# Patient Record
Sex: FEMALE | Race: WHITE | Employment: STUDENT | URBAN - METROPOLITAN AREA
[De-identification: names, ages, dates, MRNs, and addresses within clinical notes are randomized per-mention and may not be internally consistent; named-entity substitution may affect disease eponyms.]

---
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
## 2021-02-19 PROBLEM — J30.2 SEASONAL ALLERGIC RHINITIS, UNSPECIFIED TRIGGER: Status: ACTIVE | Noted: 2021-02-19

## 2021-02-19 PROBLEM — Z00.129 ENCOUNTER FOR WELL CHILD CHECK WITHOUT ABNORMAL FINDINGS: Status: RESOLVED | Noted: 2018-08-02 | Resolved: 2021-02-19

## 2021-02-23 PROBLEM — F90.2 ATTENTION DEFICIT HYPERACTIVITY DISORDER, COMBINED TYPE: Status: ACTIVE | Noted: 2021-02-23

## 2021-02-23 PROBLEM — F33.0 MAJOR DEPRESSIVE DISORDER, RECURRENT EPISODE, MILD (HCC): Status: ACTIVE | Noted: 2021-02-23

## 2021-02-24 PROBLEM — F41.9 ANXIETY DISORDER: Status: ACTIVE | Noted: 2021-02-24

## 2021-07-28 ENCOUNTER — HOSPITAL ENCOUNTER (EMERGENCY)
Facility: HOSPITAL | Age: 21
Discharge: HOME | End: 2021-07-28
Attending: EMERGENCY MEDICINE
Payer: COMMERCIAL

## 2021-07-28 VITALS
RESPIRATION RATE: 16 BRPM | BODY MASS INDEX: 21.47 KG/M2 | WEIGHT: 150 LBS | DIASTOLIC BLOOD PRESSURE: 72 MMHG | TEMPERATURE: 97 F | SYSTOLIC BLOOD PRESSURE: 110 MMHG | HEART RATE: 79 BPM | OXYGEN SATURATION: 99 % | HEIGHT: 70 IN
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
## 2021-07-28 DIAGNOSIS — S06.0X0A CLOSED HEAD INJURY WITH CONCUSSION, WITHOUT LOSS OF CONSCIOUSNESS, INITIAL ENCOUNTER: ICD-10-CM

## 2021-07-28 LAB
AMPHET UR QL SCN: NEGATIVE
BENZODIAZ UR QL SCN: NEGATIVE
CANNABINOIDS UR QL SCN: NEGATIVE
COCAINE UR QL: NEGATIVE
CREAT UR-SCNC: 100 MG/DL
MDMA UR QL SCN: NEGATIVE
OPIATES UR QL SCN: NEGATIVE
OXYCODONE UR QL SCN: NEGATIVE

## 2021-07-28 PROCEDURE — 80307 DRUG TEST PRSMV CHEM ANLYZR: CPT | Performed by: EMERGENCY MEDICINE

## 2021-07-28 PROCEDURE — 99284 EMERGENCY DEPT VISIT MOD MDM: CPT

## 2021-07-28 PROCEDURE — 80307 DRUG TEST PRSMV CHEM ANLYZR: CPT
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
## 2021-10-13 PROBLEM — F33.2 MDD (MAJOR DEPRESSIVE DISORDER), RECURRENT SEVERE, WITHOUT PSYCHOSIS (HCC): Status: ACTIVE | Noted: 2021-10-13

## 2022-12-16 ENCOUNTER — HOSPITAL ENCOUNTER (OUTPATIENT)
Age: 22
Discharge: HOME | End: 2022-12-16
Payer: COMMERCIAL

## 2022-12-16 VITALS
HEIGHT: 70 IN | WEIGHT: 160 LBS | HEART RATE: 78 BPM | OXYGEN SATURATION: 98 % | BODY MASS INDEX: 22.9 KG/M2 | SYSTOLIC BLOOD PRESSURE: 98 MMHG | RESPIRATION RATE: 16 BRPM | TEMPERATURE: 98 F | DIASTOLIC BLOOD PRESSURE: 68 MMHG

## 2022-12-16 DIAGNOSIS — R05.1 ACUTE COUGH: Primary | ICD-10-CM

## 2022-12-16 PROCEDURE — 99203 OFFICE O/P NEW LOW 30 MIN: CPT | Performed by: PHYSICIAN ASSISTANT

## 2022-12-16 RX ORDER — ALBUTEROL SULFATE 90 UG/1
2 AEROSOL, METERED RESPIRATORY (INHALATION) EVERY 4 HOURS PRN
Qty: 1 EACH | Refills: 0 | Status: SHIPPED | OUTPATIENT
Start: 2022-12-16 | End: 2022-12-16

## 2022-12-16 RX ORDER — ALBUTEROL SULFATE 90 UG/1
2 AEROSOL, METERED RESPIRATORY (INHALATION) EVERY 4 HOURS PRN
Qty: 1 EACH | Refills: 0 | Status: SHIPPED | OUTPATIENT
Start: 2022-12-16 | End: 2023-01-15

## 2023-06-04 ENCOUNTER — HOSPITAL ENCOUNTER (OUTPATIENT)
Age: 23
Discharge: HOME | End: 2023-06-04
Payer: COMMERCIAL

## 2023-06-04 VITALS
HEART RATE: 99 BPM | BODY MASS INDEX: 22.9 KG/M2 | WEIGHT: 160 LBS | TEMPERATURE: 98 F | OXYGEN SATURATION: 98 % | HEIGHT: 70 IN | DIASTOLIC BLOOD PRESSURE: 71 MMHG | SYSTOLIC BLOOD PRESSURE: 115 MMHG | RESPIRATION RATE: 18 BRPM

## 2023-06-04 DIAGNOSIS — B34.9 VIRAL ILLNESS: Primary | ICD-10-CM

## 2023-06-04 LAB
S PYO AG THROAT QL: NEGATIVE
SARS-COV-2 RNA RESP QL NAA+PROBE: NOT DETECTED

## 2023-06-04 RX ORDER — ACETAMINOPHEN 500 MG
1000 TABLET ORAL ONCE
Status: COMPLETED | OUTPATIENT
Start: 2023-06-04 | End: 2023-06-04

## 2023-06-04 NOTE — DISCHARGE INSTRUCTIONS
Your COVID and strep test were negative. Warm salt water gargles and throat lozenges. Tylenol and ibuprofen for pain or fever. Stay home when you feel unwell. Follow-up with your primary care provider.

## 2023-10-30 PROBLEM — F33.9 MDD (MAJOR DEPRESSIVE DISORDER), RECURRENT EPISODE (HCC): Status: ACTIVE | Noted: 2023-10-30

## 2023-10-31 PROBLEM — F41.1 GENERALIZED ANXIETY DISORDER: Status: ACTIVE | Noted: 2021-02-24

## 2023-10-31 PROBLEM — F41.0 PANIC DISORDER WITHOUT AGORAPHOBIA: Chronic | Status: ACTIVE | Noted: 2023-10-31

## 2023-10-31 PROBLEM — F33.9 MDD (MAJOR DEPRESSIVE DISORDER), RECURRENT EPISODE (HCC): Status: RESOLVED | Noted: 2023-10-30 | Resolved: 2023-10-31

## 2023-11-20 PROBLEM — F33.2 MDD (MAJOR DEPRESSIVE DISORDER), RECURRENT SEVERE, WITHOUT PSYCHOSIS (HCC): Chronic | Status: ACTIVE | Noted: 2021-10-13

## 2024-12-27 ENCOUNTER — OFFICE VISIT (OUTPATIENT)
Dept: FAMILY MEDICINE CLINIC | Facility: CLINIC | Age: 24
End: 2024-12-27
Payer: COMMERCIAL

## 2024-12-27 VITALS
TEMPERATURE: 97 F | SYSTOLIC BLOOD PRESSURE: 106 MMHG | RESPIRATION RATE: 18 BRPM | BODY MASS INDEX: 25.05 KG/M2 | DIASTOLIC BLOOD PRESSURE: 67 MMHG | HEIGHT: 70 IN | HEART RATE: 95 BPM | WEIGHT: 175 LBS | OXYGEN SATURATION: 99 %

## 2024-12-27 DIAGNOSIS — Z02.89 ENCOUNTER TO OBTAIN EXCUSE FROM WORK: ICD-10-CM

## 2024-12-27 DIAGNOSIS — B34.9 VIRAL SYNDROME: ICD-10-CM

## 2024-12-27 DIAGNOSIS — R05.9 COUGH, UNSPECIFIED TYPE: Primary | ICD-10-CM

## 2024-12-27 PROCEDURE — 87637 SARSCOV2&INF A&B&RSV AMP PRB: CPT | Performed by: PHYSICIAN ASSISTANT

## 2024-12-27 PROCEDURE — 3074F SYST BP LT 130 MM HG: CPT | Performed by: PHYSICIAN ASSISTANT

## 2024-12-27 PROCEDURE — 99213 OFFICE O/P EST LOW 20 MIN: CPT | Performed by: PHYSICIAN ASSISTANT

## 2024-12-27 PROCEDURE — 3078F DIAST BP <80 MM HG: CPT | Performed by: PHYSICIAN ASSISTANT

## 2024-12-27 PROCEDURE — 3008F BODY MASS INDEX DOCD: CPT | Performed by: PHYSICIAN ASSISTANT

## 2024-12-27 NOTE — PROGRESS NOTES
CHIEF COMPLAINT:     Chief Complaint   Patient presents with    Flu     Started wed, cough nasal congestion   Note for work          HPI:   Suzi Valerio is a 24 year old adult who presents for abrupt onset of URI sx x 2 days. (+) body aches/HA, nasal congestion, rhinorrhea, chest congestion, NP cough.   ?subjective fever with chills/sweats.   Denies CP, no SOB/GUIDRY, no n/v/d.  H/o bronchitis in youth, otherwise denies asthma.   OTC cough/cold meds with some relief.   Needs work excuse note.     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     Current Outpatient Medications   Medication Sig Dispense Refill    FLUoxetine 20 MG Oral Cap Take 1 capsule (20 mg total) by mouth daily. Patient will take 20 mg along with 40 mg for a total of 60 mg daily 90 capsule 0    methylphenidate ER (CONCERTA) 27 MG Oral Tab CR Take 1 tablet (27 mg total) by mouth every morning. 10 tablet 0    FLUoxetine HCl 40 MG Oral Cap TAKE 1 CAPSULE BY MOUTH DAILY ALONG WITH 10MG FOR TOTAL OF 50MG DAILY 90 capsule 0    BUPROPION  MG Oral Tablet 24 Hr TAKE 1 TABLET BY MOUTH EVERY DAY IN THE MORNING 90 tablet 0    ARIPIPRAZOLE 5 MG Oral Tab TAKE 1 TABLET (5 MG TOTAL) BY MOUTH DAILY. 90 tablet 0      No past medical history on file.   No past surgical history on file.      Social History     Socioeconomic History    Marital status: Single   Tobacco Use    Smoking status: Never    Smokeless tobacco: Never   Vaping Use    Vaping status: Never Used   Substance and Sexual Activity    Alcohol use: Not Currently     Comment: special events only    Drug use: Not Currently     Types: Cannabis     Comment: once or twice a month edible use-rarely     Social Drivers of Health     Financial Resource Strain: Low Risk  (12/19/2024)    Financial Resource Strain     Med Affordability: No   Food Insecurity: Unknown (12/19/2024)    Food Insecurity     Food Insecurity: Patient declined   Transportation Needs: Unknown (12/19/2024)    Transportation  Needs     Lack of Transportation: Patient declined   Physical Activity: Not on File (9/15/2022)    Received from [facility]    Physical Activity     Physical Activity: 0   Stress: Not on File (9/15/2022)    Received from [facility]    Stress     Stress: 0   Social Connections: Not on File (9/13/2024)    Received from [facility]    Social Connections     Connectedness: 0   Housing Stability: Unknown (12/19/2024)    Housing Stability     Housing Instability: Patient declined         REVIEW OF SYSTEMS:   GENERAL: normal appetite  SKIN: no rashes or abnormal skin lesions  HEENT: See HPI  LUNGS: See HPI  CARDIOVASCULAR: denies chest pain or palpitations   GI: denies N/V/C or abdominal pain      EXAM:   /67   Pulse 95   Temp 97 °F (36.1 °C)   Resp 18   Ht 5' 10\" (1.778 m)   Wt 175 lb (79.4 kg)   LMP 12/02/2024   SpO2 99%   BMI 25.11 kg/m²   GENERAL: well developed, well nourished,in no apparent distress  SKIN: no rashes,no suspicious lesions  HEAD: atraumatic, normocephalic.  no tenderness on palpation of  sinuses  EYES: conjunctiva clear, EOM intact  EARS: TM's clear bilaterally  NOSE: Nostrils patent, clear nasal discharge, nasal mucosa erythematous/edematous   THROAT: Oral mucosa pink, moist. Posterior pharynx is mildly injected, no hypertrophy/exudates, uvula midline.   NECK: Supple, non-tender  LUNGS: clear to auscultation bilaterally, no wheezes or rhonchi. Breathing is non labored.  CARDIO: RRR without murmur  EXTREMITIES: no cyanosis, clubbing or edema  LYMPH:  shotty ant cervical LAD.   ASSESSMENT AND PLAN:   Suzi Valerio is a 24 year old adult who presents with upper respiratory symptoms that are consistent with    ASSESSMENT:   Encounter Diagnoses   Name Primary?    Cough, unspecified type Yes    Viral syndrome     Encounter to obtain excuse from work        PLAN:    Viral panel pending. Discussed COVID-19 antiviral therapy and interaction with ariprazole.  Reviewed if antiviral tx for COVID-19  desired, recommend contacting provider managing ariprazole to determine risk/benefit and dosage adjustment for ariprazole.  Pt reports she may not be interested in antiviral therapy and is more concerned with identifying illness given occupation and obtaining work excuse note for illness.    Viral etiology with expected course/duration reviewed, sx management discussed.   Work excuse note per epic, see letters.       The patient indicates understanding of these issues and agrees to the plan.  The patient is asked to f/u with PCP if sx's persist or worsen.  Patient Instructions   Viral panel pending, results in 24 hours.  (RSV, influenza A/B, COVID-19).     In event you have COVID-19, you are candidate for antiviral therapy within 5 days of symptom onset.  However, given potential for medication reaction with ariprazole, please contact provider managing this medication for antiviral therapy (paxlovid) if desired.       Encourage fluids, humidifier/vaporizor at bedside, elevate head of bed (sleep with extra pillow), vapor rub to chest, steam therapy if no fever, warm compresses for sinus pressure if no fever, salt water gargles for sore throat, lozenges for sore throat, may try over the counter saline nasal spray or irrigation kit (use distilled water with irrigation kit) for sinus pressure/congestion, get plenty of rest.          Follow up with your primary care provider if your symptoms fail to improve and resolve as anticipated    Go to the Immediate Care or Emergency Department in event of new or worsening symptoms at any time       Lori Summers PA-C

## 2024-12-28 LAB
FLUAV + FLUBV RNA SPEC NAA+PROBE: NOT DETECTED
FLUAV + FLUBV RNA SPEC NAA+PROBE: NOT DETECTED
RSV RNA SPEC NAA+PROBE: NOT DETECTED
SARS-COV-2 RNA RESP QL NAA+PROBE: NOT DETECTED

## 2024-12-28 NOTE — PATIENT INSTRUCTIONS
Viral panel pending, results in 24 hours.  (RSV, influenza A/B, COVID-19).     In event you have COVID-19, you are candidate for antiviral therapy within 5 days of symptom onset.  However, given potential for medication reaction with ariprazole, please contact provider managing this medication for antiviral therapy (paxlovid) if desired.       Encourage fluids, humidifier/vaporizor at bedside, elevate head of bed (sleep with extra pillow), vapor rub to chest, steam therapy if no fever, warm compresses for sinus pressure if no fever, salt water gargles for sore throat, lozenges for sore throat, may try over the counter saline nasal spray or irrigation kit (use distilled water with irrigation kit) for sinus pressure/congestion, get plenty of rest.          Follow up with your primary care provider if your symptoms fail to improve and resolve as anticipated    Go to the Immediate Care or Emergency Department in event of new or worsening symptoms at any time

## (undated) NOTE — LETTER
Date & Time: 7/28/2021, 4:48 PM  Patient: Mark Guillory  Encounter Provider(s):    Vielka Aceves MD       To Whom It May Concern:    Mark Guillory was seen and treated in our department on 7/28/2021.  Ty Hadry is excused from school as well as school w

## (undated) NOTE — LETTER
Date: 12/27/2024    Patient Name: Suzi Valerio          To Whom it may concern:    This letter has been written at the patient's request. The above patient was seen at [facility] for treatment of a medical condition.    This patient should be excused from attending work/school from 12/27/24 through 12/28/24.     Sincerely,    Lori Summers PA-C